# Patient Record
Sex: FEMALE | Race: WHITE | NOT HISPANIC OR LATINO
[De-identification: names, ages, dates, MRNs, and addresses within clinical notes are randomized per-mention and may not be internally consistent; named-entity substitution may affect disease eponyms.]

---

## 2017-03-01 ENCOUNTER — RESULT REVIEW (OUTPATIENT)
Age: 32
End: 2017-03-01

## 2017-03-02 ENCOUNTER — MESSAGE (OUTPATIENT)
Age: 32
End: 2017-03-02

## 2017-03-07 ENCOUNTER — RECORD ABSTRACTING (OUTPATIENT)
Age: 32
End: 2017-03-07

## 2017-03-07 DIAGNOSIS — E10.9 TYPE 1 DIABETES MELLITUS W/OUT COMPLICATIONS: ICD-10-CM

## 2017-03-07 DIAGNOSIS — K90.0 CELIAC DISEASE: ICD-10-CM

## 2017-03-07 DIAGNOSIS — O09.90 SUPERVISION OF HIGH RISK PREGNANCY, UNSPECIFIED, UNSPECIFIED TRIMESTER: ICD-10-CM

## 2017-03-07 DIAGNOSIS — O24.919 UNSPECIFIED DIABETES MELLITUS IN PREGNANCY, UNSPECIFIED TRIMESTER: ICD-10-CM

## 2017-03-07 DIAGNOSIS — R73.09 OTHER ABNORMAL GLUCOSE: ICD-10-CM

## 2017-03-07 DIAGNOSIS — K83.1 LIVER AND BILIARY TRACT DISORDERS IN PREGNANCY, THIRD TRIMESTER: ICD-10-CM

## 2017-03-07 DIAGNOSIS — O26.613 LIVER AND BILIARY TRACT DISORDERS IN PREGNANCY, THIRD TRIMESTER: ICD-10-CM

## 2017-03-07 DIAGNOSIS — K46.9 UNSPECIFIED ABDOMINAL HERNIA W/OUT OBSTRUCTION OR GANGRENE: ICD-10-CM

## 2017-03-07 DIAGNOSIS — Z83.79 FAMILY HISTORY OF OTHER DISEASES OF THE DIGESTIVE SYSTEM: ICD-10-CM

## 2017-03-07 DIAGNOSIS — Z87.59 PERSONAL HISTORY OF OTHER COMPLICATIONS OF PREGNANCY, CHILDBIRTH AND THE PUERPERIUM: ICD-10-CM

## 2017-03-07 DIAGNOSIS — Z81.0 FAMILY HISTORY OF INTELLECTUAL DISABILITIES: ICD-10-CM

## 2017-03-07 DIAGNOSIS — Z83.49 FAMILY HISTORY OF OTHER ENDOCRINE, NUTRITIONAL AND METABOLIC DISEASES: ICD-10-CM

## 2017-03-07 DIAGNOSIS — E03.9 HYPOTHYROIDISM, UNSPECIFIED: ICD-10-CM

## 2017-03-09 ENCOUNTER — APPOINTMENT (OUTPATIENT)
Dept: MATERNAL FETAL MEDICINE | Facility: CLINIC | Age: 32
End: 2017-03-09

## 2017-03-09 ENCOUNTER — APPOINTMENT (OUTPATIENT)
Dept: ANTEPARTUM | Facility: CLINIC | Age: 32
End: 2017-03-09

## 2017-03-09 ENCOUNTER — ASOB RESULT (OUTPATIENT)
Age: 32
End: 2017-03-09

## 2017-03-09 VITALS
BODY MASS INDEX: 25.64 KG/M2 | SYSTOLIC BLOOD PRESSURE: 92 MMHG | WEIGHT: 152 LBS | HEIGHT: 64.5 IN | DIASTOLIC BLOOD PRESSURE: 64 MMHG

## 2017-03-09 RX ORDER — URINE ACETONE TEST STRIPS
STRIP MISCELLANEOUS
Qty: 1 | Refills: 1 | Status: ACTIVE | COMMUNITY
Start: 2017-03-09 | End: 1900-01-01

## 2017-03-10 LAB
CREAT SPEC-SCNC: 34 MG/DL
CREAT/PROT UR: 0.1 RATIO
PROT UR-MCNC: 5 MG/DL

## 2017-03-23 ENCOUNTER — APPOINTMENT (OUTPATIENT)
Dept: MATERNAL FETAL MEDICINE | Facility: CLINIC | Age: 32
End: 2017-03-23

## 2017-04-04 ENCOUNTER — ASOB RESULT (OUTPATIENT)
Age: 32
End: 2017-04-04

## 2017-04-04 ENCOUNTER — LABORATORY RESULT (OUTPATIENT)
Age: 32
End: 2017-04-04

## 2017-04-04 ENCOUNTER — APPOINTMENT (OUTPATIENT)
Dept: MATERNAL FETAL MEDICINE | Facility: CLINIC | Age: 32
End: 2017-04-04

## 2017-04-04 ENCOUNTER — APPOINTMENT (OUTPATIENT)
Dept: ANTEPARTUM | Facility: CLINIC | Age: 32
End: 2017-04-04

## 2017-04-05 LAB
T4 FREE SERPL-MCNC: 1.5 NG/DL
T4 SERPL-MCNC: 12.8 UG/DL

## 2017-04-10 LAB — TSI ACT/NOR SER: 2.7 TSI INDEX

## 2017-05-09 ENCOUNTER — ASOB RESULT (OUTPATIENT)
Age: 32
End: 2017-05-09

## 2017-05-09 ENCOUNTER — APPOINTMENT (OUTPATIENT)
Dept: MATERNAL FETAL MEDICINE | Facility: CLINIC | Age: 32
End: 2017-05-09

## 2017-05-09 ENCOUNTER — APPOINTMENT (OUTPATIENT)
Dept: ANTEPARTUM | Facility: CLINIC | Age: 32
End: 2017-05-09

## 2017-05-19 ENCOUNTER — APPOINTMENT (OUTPATIENT)
Dept: MATERNAL FETAL MEDICINE | Facility: CLINIC | Age: 32
End: 2017-05-19

## 2017-06-01 ENCOUNTER — APPOINTMENT (OUTPATIENT)
Dept: ANTEPARTUM | Facility: CLINIC | Age: 32
End: 2017-06-01

## 2017-06-02 ENCOUNTER — ASOB RESULT (OUTPATIENT)
Age: 32
End: 2017-06-02

## 2017-06-02 ENCOUNTER — APPOINTMENT (OUTPATIENT)
Dept: ANTEPARTUM | Facility: CLINIC | Age: 32
End: 2017-06-02

## 2017-06-06 ENCOUNTER — APPOINTMENT (OUTPATIENT)
Dept: MATERNAL FETAL MEDICINE | Facility: CLINIC | Age: 32
End: 2017-06-06

## 2017-06-06 VITALS — BODY MASS INDEX: 27.75 KG/M2 | WEIGHT: 164.19 LBS

## 2017-06-06 RX ORDER — URINE GLUCOSE-ACET TEST STRIP
STRIP MISCELLANEOUS
Qty: 1 | Refills: 0 | Status: ACTIVE | COMMUNITY
Start: 2017-06-06 | End: 1900-01-01

## 2017-06-12 ENCOUNTER — APPOINTMENT (OUTPATIENT)
Dept: ANTEPARTUM | Facility: CLINIC | Age: 32
End: 2017-06-12

## 2017-06-12 ENCOUNTER — ASOB RESULT (OUTPATIENT)
Age: 32
End: 2017-06-12

## 2017-06-13 ENCOUNTER — MESSAGE (OUTPATIENT)
Age: 32
End: 2017-06-13

## 2017-06-20 ENCOUNTER — APPOINTMENT (OUTPATIENT)
Dept: MATERNAL FETAL MEDICINE | Facility: CLINIC | Age: 32
End: 2017-06-20

## 2017-06-20 VITALS — WEIGHT: 164.19 LBS | BODY MASS INDEX: 27.75 KG/M2

## 2017-06-27 ENCOUNTER — MESSAGE (OUTPATIENT)
Age: 32
End: 2017-06-27

## 2017-06-29 ENCOUNTER — APPOINTMENT (OUTPATIENT)
Dept: MATERNAL FETAL MEDICINE | Facility: CLINIC | Age: 32
End: 2017-06-29

## 2017-06-29 ENCOUNTER — ASOB RESULT (OUTPATIENT)
Age: 32
End: 2017-06-29

## 2017-06-29 ENCOUNTER — APPOINTMENT (OUTPATIENT)
Dept: ANTEPARTUM | Facility: CLINIC | Age: 32
End: 2017-06-29

## 2017-06-29 LAB — HBA1C MFR BLD HPLC: 6.8 %

## 2017-07-08 ENCOUNTER — OUTPATIENT (OUTPATIENT)
Dept: OUTPATIENT SERVICES | Facility: HOSPITAL | Age: 32
LOS: 1 days | End: 2017-07-08
Payer: COMMERCIAL

## 2017-07-08 DIAGNOSIS — O26.899 OTHER SPECIFIED PREGNANCY RELATED CONDITIONS, UNSPECIFIED TRIMESTER: ICD-10-CM

## 2017-07-08 DIAGNOSIS — Z3A.00 WEEKS OF GESTATION OF PREGNANCY NOT SPECIFIED: ICD-10-CM

## 2017-07-08 LAB
APPEARANCE UR: CLEAR — SIGNIFICANT CHANGE UP
BILIRUB UR-MCNC: NEGATIVE — SIGNIFICANT CHANGE UP
COLOR SPEC: SIGNIFICANT CHANGE UP
DIFF PNL FLD: ABNORMAL
GLUCOSE UR QL: 500
KETONES UR-MCNC: NEGATIVE — SIGNIFICANT CHANGE UP
LEUKOCYTE ESTERASE UR-ACNC: NEGATIVE — SIGNIFICANT CHANGE UP
NITRITE UR-MCNC: NEGATIVE — SIGNIFICANT CHANGE UP
PH UR: 7 — SIGNIFICANT CHANGE UP (ref 5–8)
PROT UR-MCNC: NEGATIVE — SIGNIFICANT CHANGE UP
SP GR SPEC: <1.005 — LOW (ref 1.01–1.02)
UROBILINOGEN FLD QL: NEGATIVE — SIGNIFICANT CHANGE UP

## 2017-07-08 PROCEDURE — 81001 URINALYSIS AUTO W/SCOPE: CPT

## 2017-07-08 PROCEDURE — 87086 URINE CULTURE/COLONY COUNT: CPT

## 2017-07-08 PROCEDURE — 59025 FETAL NON-STRESS TEST: CPT

## 2017-07-08 PROCEDURE — G0463: CPT

## 2017-07-09 LAB
CULTURE RESULTS: SIGNIFICANT CHANGE UP
SPECIMEN SOURCE: SIGNIFICANT CHANGE UP

## 2017-07-11 ENCOUNTER — APPOINTMENT (OUTPATIENT)
Dept: MATERNAL FETAL MEDICINE | Facility: CLINIC | Age: 32
End: 2017-07-11

## 2017-07-17 ENCOUNTER — MESSAGE (OUTPATIENT)
Age: 32
End: 2017-07-17

## 2017-07-18 ENCOUNTER — MESSAGE (OUTPATIENT)
Age: 32
End: 2017-07-18

## 2017-08-04 ENCOUNTER — APPOINTMENT (OUTPATIENT)
Dept: ANTEPARTUM | Facility: CLINIC | Age: 32
End: 2017-08-04
Payer: COMMERCIAL

## 2017-08-04 ENCOUNTER — ASOB RESULT (OUTPATIENT)
Age: 32
End: 2017-08-04

## 2017-08-04 ENCOUNTER — APPOINTMENT (OUTPATIENT)
Dept: MATERNAL FETAL MEDICINE | Facility: CLINIC | Age: 32
End: 2017-08-04
Payer: COMMERCIAL

## 2017-08-04 PROCEDURE — G0108 DIAB MANAGE TRN  PER INDIV: CPT

## 2017-08-04 PROCEDURE — 95251 CONT GLUC MNTR ANALYSIS I&R: CPT

## 2017-08-04 PROCEDURE — 76816 OB US FOLLOW-UP PER FETUS: CPT

## 2017-08-08 ENCOUNTER — OTHER (OUTPATIENT)
Age: 32
End: 2017-08-08

## 2017-08-17 ENCOUNTER — APPOINTMENT (OUTPATIENT)
Dept: MATERNAL FETAL MEDICINE | Facility: CLINIC | Age: 32
End: 2017-08-17
Payer: COMMERCIAL

## 2017-08-17 PROCEDURE — G0108 DIAB MANAGE TRN  PER INDIV: CPT

## 2017-08-24 ENCOUNTER — APPOINTMENT (OUTPATIENT)
Dept: MATERNAL FETAL MEDICINE | Facility: CLINIC | Age: 32
End: 2017-08-24
Payer: COMMERCIAL

## 2017-08-24 ENCOUNTER — ASOB RESULT (OUTPATIENT)
Age: 32
End: 2017-08-24

## 2017-08-24 ENCOUNTER — APPOINTMENT (OUTPATIENT)
Dept: ANTEPARTUM | Facility: CLINIC | Age: 32
End: 2017-08-24
Payer: COMMERCIAL

## 2017-08-24 VITALS
SYSTOLIC BLOOD PRESSURE: 110 MMHG | WEIGHT: 179.13 LBS | DIASTOLIC BLOOD PRESSURE: 66 MMHG | BODY MASS INDEX: 30.27 KG/M2

## 2017-08-24 DIAGNOSIS — E03.9 ENDOCRINE, NUTRITIONAL AND METABOLIC DISEASES COMPLICATING PREGNANCY, UNSPECIFIED TRIMESTER: ICD-10-CM

## 2017-08-24 DIAGNOSIS — O99.280 ENDOCRINE, NUTRITIONAL AND METABOLIC DISEASES COMPLICATING PREGNANCY, UNSPECIFIED TRIMESTER: ICD-10-CM

## 2017-08-24 PROCEDURE — 76816 OB US FOLLOW-UP PER FETUS: CPT

## 2017-08-24 PROCEDURE — 76818 FETAL BIOPHYS PROFILE W/NST: CPT

## 2017-08-24 PROCEDURE — G0108 DIAB MANAGE TRN  PER INDIV: CPT

## 2017-08-25 LAB
HBA1C MFR BLD HPLC: 7.3 %
TSH SERPL-ACNC: 1.75 UIU/ML

## 2017-08-29 ENCOUNTER — ASOB RESULT (OUTPATIENT)
Age: 32
End: 2017-08-29

## 2017-08-29 ENCOUNTER — APPOINTMENT (OUTPATIENT)
Dept: ANTEPARTUM | Facility: CLINIC | Age: 32
End: 2017-08-29
Payer: COMMERCIAL

## 2017-08-29 PROCEDURE — 59025 FETAL NON-STRESS TEST: CPT

## 2017-09-01 ENCOUNTER — ASOB RESULT (OUTPATIENT)
Age: 32
End: 2017-09-01

## 2017-09-01 ENCOUNTER — APPOINTMENT (OUTPATIENT)
Dept: ANTEPARTUM | Facility: CLINIC | Age: 32
End: 2017-09-01
Payer: COMMERCIAL

## 2017-09-01 PROCEDURE — 76818 FETAL BIOPHYS PROFILE W/NST: CPT

## 2017-09-05 ENCOUNTER — APPOINTMENT (OUTPATIENT)
Dept: ANTEPARTUM | Facility: CLINIC | Age: 32
End: 2017-09-05
Payer: COMMERCIAL

## 2017-09-05 ENCOUNTER — ASOB RESULT (OUTPATIENT)
Age: 32
End: 2017-09-05

## 2017-09-05 PROCEDURE — 59025 FETAL NON-STRESS TEST: CPT

## 2017-09-08 ENCOUNTER — ASOB RESULT (OUTPATIENT)
Age: 32
End: 2017-09-08

## 2017-09-08 ENCOUNTER — APPOINTMENT (OUTPATIENT)
Dept: ANTEPARTUM | Facility: CLINIC | Age: 32
End: 2017-09-08
Payer: COMMERCIAL

## 2017-09-08 ENCOUNTER — APPOINTMENT (OUTPATIENT)
Dept: MATERNAL FETAL MEDICINE | Facility: CLINIC | Age: 32
End: 2017-09-08
Payer: COMMERCIAL

## 2017-09-08 VITALS — WEIGHT: 179.19 LBS | BODY MASS INDEX: 30.28 KG/M2

## 2017-09-08 PROCEDURE — 76818 FETAL BIOPHYS PROFILE W/NST: CPT

## 2017-09-08 PROCEDURE — G0108 DIAB MANAGE TRN  PER INDIV: CPT

## 2017-09-08 RX ORDER — BLOOD SUGAR DIAGNOSTIC
STRIP MISCELLANEOUS
Qty: 4 | Refills: 3 | Status: ACTIVE | COMMUNITY
Start: 2017-09-08 | End: 1900-01-01

## 2017-09-12 ENCOUNTER — APPOINTMENT (OUTPATIENT)
Dept: MATERNAL FETAL MEDICINE | Facility: CLINIC | Age: 32
End: 2017-09-12

## 2017-09-12 ENCOUNTER — ASOB RESULT (OUTPATIENT)
Age: 32
End: 2017-09-12

## 2017-09-12 ENCOUNTER — APPOINTMENT (OUTPATIENT)
Dept: ANTEPARTUM | Facility: CLINIC | Age: 32
End: 2017-09-12
Payer: COMMERCIAL

## 2017-09-12 ENCOUNTER — MESSAGE (OUTPATIENT)
Age: 32
End: 2017-09-12

## 2017-09-12 PROCEDURE — 59025 FETAL NON-STRESS TEST: CPT

## 2017-09-15 ENCOUNTER — ASOB RESULT (OUTPATIENT)
Age: 32
End: 2017-09-15

## 2017-09-15 ENCOUNTER — APPOINTMENT (OUTPATIENT)
Dept: ANTEPARTUM | Facility: CLINIC | Age: 32
End: 2017-09-15
Payer: COMMERCIAL

## 2017-09-15 PROCEDURE — 76818 FETAL BIOPHYS PROFILE W/NST: CPT

## 2017-09-19 ENCOUNTER — ASOB RESULT (OUTPATIENT)
Age: 32
End: 2017-09-19

## 2017-09-19 ENCOUNTER — APPOINTMENT (OUTPATIENT)
Dept: ANTEPARTUM | Facility: CLINIC | Age: 32
End: 2017-09-19
Payer: COMMERCIAL

## 2017-09-19 ENCOUNTER — APPOINTMENT (OUTPATIENT)
Dept: MATERNAL FETAL MEDICINE | Facility: CLINIC | Age: 32
End: 2017-09-19
Payer: COMMERCIAL

## 2017-09-19 PROCEDURE — 76818 FETAL BIOPHYS PROFILE W/NST: CPT

## 2017-09-19 PROCEDURE — 99213 OFFICE O/P EST LOW 20 MIN: CPT | Mod: 25

## 2017-09-19 PROCEDURE — 76816 OB US FOLLOW-UP PER FETUS: CPT

## 2017-09-19 PROCEDURE — G0108 DIAB MANAGE TRN  PER INDIV: CPT

## 2017-09-22 ENCOUNTER — APPOINTMENT (OUTPATIENT)
Dept: ANTEPARTUM | Facility: CLINIC | Age: 32
End: 2017-09-22

## 2017-09-23 ENCOUNTER — OUTPATIENT (OUTPATIENT)
Dept: OUTPATIENT SERVICES | Facility: HOSPITAL | Age: 32
LOS: 1 days | End: 2017-09-23
Payer: COMMERCIAL

## 2017-09-23 DIAGNOSIS — Z3A.00 WEEKS OF GESTATION OF PREGNANCY NOT SPECIFIED: ICD-10-CM

## 2017-09-23 DIAGNOSIS — O26.899 OTHER SPECIFIED PREGNANCY RELATED CONDITIONS, UNSPECIFIED TRIMESTER: ICD-10-CM

## 2017-09-23 PROCEDURE — 59025 FETAL NON-STRESS TEST: CPT

## 2017-09-23 PROCEDURE — 82239 BILE ACIDS TOTAL: CPT

## 2017-09-23 PROCEDURE — G0463: CPT

## 2017-09-23 PROCEDURE — 83789 MASS SPECTROMETRY QUAL/QUAN: CPT

## 2017-09-23 PROCEDURE — 59025 FETAL NON-STRESS TEST: CPT | Mod: 26

## 2017-09-26 ENCOUNTER — ASOB RESULT (OUTPATIENT)
Age: 32
End: 2017-09-26

## 2017-09-26 ENCOUNTER — APPOINTMENT (OUTPATIENT)
Dept: ANTEPARTUM | Facility: CLINIC | Age: 32
End: 2017-09-26
Payer: COMMERCIAL

## 2017-09-26 ENCOUNTER — APPOINTMENT (OUTPATIENT)
Dept: MATERNAL FETAL MEDICINE | Facility: CLINIC | Age: 32
End: 2017-09-26
Payer: COMMERCIAL

## 2017-09-26 LAB — BILE AC FLD-MCNC: 23.9 UMOL/L — SIGNIFICANT CHANGE UP (ref 4.7–24.5)

## 2017-09-26 PROCEDURE — G0108 DIAB MANAGE TRN  PER INDIV: CPT

## 2017-09-26 PROCEDURE — 95251 CONT GLUC MNTR ANALYSIS I&R: CPT

## 2017-09-26 PROCEDURE — 76818 FETAL BIOPHYS PROFILE W/NST: CPT

## 2017-09-28 LAB — BILE AC P FAST SER-SCNC: ABNORMAL

## 2017-09-29 ENCOUNTER — ASOB RESULT (OUTPATIENT)
Age: 32
End: 2017-09-29

## 2017-09-29 ENCOUNTER — APPOINTMENT (OUTPATIENT)
Dept: ANTEPARTUM | Facility: CLINIC | Age: 32
End: 2017-09-29
Payer: COMMERCIAL

## 2017-09-29 PROCEDURE — 59025 FETAL NON-STRESS TEST: CPT

## 2017-09-30 ENCOUNTER — INPATIENT (INPATIENT)
Facility: HOSPITAL | Age: 32
LOS: 2 days | Discharge: HOME | End: 2017-10-03
Attending: OBSTETRICS & GYNECOLOGY | Admitting: OBSTETRICS & GYNECOLOGY
Payer: COMMERCIAL

## 2017-09-30 DIAGNOSIS — O24.419 GESTATIONAL DIABETES MELLITUS IN PREGNANCY, UNSPECIFIED CONTROL: ICD-10-CM

## 2017-09-30 LAB
BASOPHILS # BLD AUTO: 0.1 K/UL — SIGNIFICANT CHANGE UP (ref 0–0.2)
BASOPHILS NFR BLD AUTO: 0.8 % — SIGNIFICANT CHANGE UP (ref 0–2)
EOSINOPHIL # BLD AUTO: 0.1 K/UL — SIGNIFICANT CHANGE UP (ref 0–0.5)
EOSINOPHIL NFR BLD AUTO: 1 % — SIGNIFICANT CHANGE UP (ref 0–6)
HCT VFR BLD CALC: 33.1 % — LOW (ref 34.5–45)
HGB BLD-MCNC: 11.1 G/DL — LOW (ref 11.5–15.5)
LYMPHOCYTES # BLD AUTO: 2.3 K/UL — SIGNIFICANT CHANGE UP (ref 1–3.3)
LYMPHOCYTES # BLD AUTO: 22 % — SIGNIFICANT CHANGE UP (ref 13–44)
MCHC RBC-ENTMCNC: 27.3 PG — SIGNIFICANT CHANGE UP (ref 27–34)
MCHC RBC-ENTMCNC: 33.5 GM/DL — SIGNIFICANT CHANGE UP (ref 32–36)
MCV RBC AUTO: 81.4 FL — SIGNIFICANT CHANGE UP (ref 80–100)
MONOCYTES # BLD AUTO: 0.5 K/UL — SIGNIFICANT CHANGE UP (ref 0–0.9)
MONOCYTES NFR BLD AUTO: 5.2 % — SIGNIFICANT CHANGE UP (ref 2–14)
NEUTROPHILS # BLD AUTO: 7.3 K/UL — SIGNIFICANT CHANGE UP (ref 1.8–7.4)
NEUTROPHILS NFR BLD AUTO: 71 % — SIGNIFICANT CHANGE UP (ref 43–77)
PLATELET # BLD AUTO: 383 K/UL — SIGNIFICANT CHANGE UP (ref 150–400)
RBC # BLD: 4.07 M/UL — SIGNIFICANT CHANGE UP (ref 3.8–5.2)
RBC # FLD: 13.8 % — SIGNIFICANT CHANGE UP (ref 10.3–14.5)
WBC # BLD: 10.3 K/UL — SIGNIFICANT CHANGE UP (ref 3.8–10.5)
WBC # FLD AUTO: 10.3 K/UL — SIGNIFICANT CHANGE UP (ref 3.8–10.5)

## 2017-09-30 RX ORDER — DEXTROSE 50 % IN WATER 50 %
12.5 SYRINGE (ML) INTRAVENOUS ONCE
Qty: 0 | Refills: 0 | Status: DISCONTINUED | OUTPATIENT
Start: 2017-09-30 | End: 2017-10-03

## 2017-09-30 RX ORDER — DEXTROSE 50 % IN WATER 50 %
25 SYRINGE (ML) INTRAVENOUS ONCE
Qty: 0 | Refills: 0 | Status: DISCONTINUED | OUTPATIENT
Start: 2017-09-30 | End: 2017-10-03

## 2017-09-30 RX ORDER — SODIUM CHLORIDE 9 MG/ML
1000 INJECTION, SOLUTION INTRAVENOUS
Qty: 0 | Refills: 0 | Status: DISCONTINUED | OUTPATIENT
Start: 2017-09-30 | End: 2017-10-03

## 2017-09-30 RX ORDER — DEXTROSE 50 % IN WATER 50 %
1 SYRINGE (ML) INTRAVENOUS ONCE
Qty: 0 | Refills: 0 | Status: DISCONTINUED | OUTPATIENT
Start: 2017-09-30 | End: 2017-10-03

## 2017-09-30 RX ORDER — INSULIN ASPART 100 [IU]/ML
1 INJECTION, SOLUTION SUBCUTANEOUS
Qty: 0 | Refills: 0 | Status: DISCONTINUED | OUTPATIENT
Start: 2017-09-30 | End: 2017-10-01

## 2017-09-30 RX ORDER — GLUCAGON INJECTION, SOLUTION 0.5 MG/.1ML
1 INJECTION, SOLUTION SUBCUTANEOUS ONCE
Qty: 0 | Refills: 0 | Status: DISCONTINUED | OUTPATIENT
Start: 2017-09-30 | End: 2017-10-03

## 2017-09-30 RX ORDER — OXYTOCIN 10 UNIT/ML
333.33 VIAL (ML) INJECTION
Qty: 20 | Refills: 0 | Status: COMPLETED | OUTPATIENT
Start: 2017-09-30

## 2017-09-30 RX ORDER — SODIUM CHLORIDE 9 MG/ML
1000 INJECTION, SOLUTION INTRAVENOUS
Qty: 0 | Refills: 0 | Status: DISCONTINUED | OUTPATIENT
Start: 2017-09-30 | End: 2017-10-01

## 2017-09-30 RX ORDER — OXYTOCIN 10 UNIT/ML
4 VIAL (ML) INJECTION
Qty: 30 | Refills: 0 | Status: DISCONTINUED | OUTPATIENT
Start: 2017-09-30 | End: 2017-10-01

## 2017-09-30 RX ORDER — CITRIC ACID/SODIUM CITRATE 300-500 MG
15 SOLUTION, ORAL ORAL EVERY 4 HOURS
Qty: 0 | Refills: 0 | Status: DISCONTINUED | OUTPATIENT
Start: 2017-09-30 | End: 2017-10-01

## 2017-09-30 RX ORDER — SODIUM CHLORIDE 9 MG/ML
1000 INJECTION INTRAMUSCULAR; INTRAVENOUS; SUBCUTANEOUS
Qty: 0 | Refills: 0 | Status: DISCONTINUED | OUTPATIENT
Start: 2017-09-30 | End: 2017-10-01

## 2017-09-30 RX ORDER — SODIUM CHLORIDE 9 MG/ML
1000 INJECTION, SOLUTION INTRAVENOUS ONCE
Qty: 0 | Refills: 0 | Status: DISCONTINUED | OUTPATIENT
Start: 2017-09-30 | End: 2017-10-01

## 2017-09-30 RX ADMIN — SODIUM CHLORIDE 125 MILLILITER(S): 9 INJECTION INTRAMUSCULAR; INTRAVENOUS; SUBCUTANEOUS at 22:55

## 2017-09-30 RX ADMIN — Medication 4 MILLIUNIT(S)/MIN: at 23:47

## 2017-10-01 VITALS — HEIGHT: 65 IN | WEIGHT: 174.17 LBS

## 2017-10-01 DIAGNOSIS — E11.9 TYPE 2 DIABETES MELLITUS WITHOUT COMPLICATIONS: ICD-10-CM

## 2017-10-01 DIAGNOSIS — E03.9 HYPOTHYROIDISM, UNSPECIFIED: ICD-10-CM

## 2017-10-01 LAB
BLD GP AB SCN SERPL QL: NEGATIVE — SIGNIFICANT CHANGE UP
HBA1C BLD-MCNC: 7.6 % — HIGH (ref 4–5.6)
RH IG SCN BLD-IMP: POSITIVE — SIGNIFICANT CHANGE UP
T PALLIDUM AB TITR SER: NEGATIVE — SIGNIFICANT CHANGE UP

## 2017-10-01 PROCEDURE — 99254 IP/OBS CNSLTJ NEW/EST MOD 60: CPT | Mod: GC

## 2017-10-01 RX ORDER — IBUPROFEN 200 MG
600 TABLET ORAL EVERY 6 HOURS
Qty: 0 | Refills: 0 | Status: COMPLETED | OUTPATIENT
Start: 2017-10-01 | End: 2018-08-30

## 2017-10-01 RX ORDER — OXYTOCIN 10 UNIT/ML
4 VIAL (ML) INJECTION
Qty: 30 | Refills: 0 | Status: DISCONTINUED | OUTPATIENT
Start: 2017-10-01 | End: 2017-10-03

## 2017-10-01 RX ORDER — IBUPROFEN 200 MG
600 TABLET ORAL EVERY 6 HOURS
Qty: 0 | Refills: 0 | Status: DISCONTINUED | OUTPATIENT
Start: 2017-10-01 | End: 2017-10-03

## 2017-10-01 RX ORDER — KETOROLAC TROMETHAMINE 30 MG/ML
30 SYRINGE (ML) INJECTION ONCE
Qty: 0 | Refills: 0 | Status: DISCONTINUED | OUTPATIENT
Start: 2017-10-01 | End: 2017-10-03

## 2017-10-01 RX ORDER — OXYTOCIN 10 UNIT/ML
333.33 VIAL (ML) INJECTION
Qty: 20 | Refills: 0 | Status: COMPLETED | OUTPATIENT
Start: 2017-10-01 | End: 2017-10-01

## 2017-10-01 RX ORDER — OXYCODONE HYDROCHLORIDE 5 MG/1
5 TABLET ORAL
Qty: 0 | Refills: 0 | Status: DISCONTINUED | OUTPATIENT
Start: 2017-10-01 | End: 2017-10-03

## 2017-10-01 RX ORDER — INSULIN ASPART 100 [IU]/ML
1 INJECTION, SOLUTION SUBCUTANEOUS
Qty: 0 | Refills: 0 | Status: DISCONTINUED | OUTPATIENT
Start: 2017-10-01 | End: 2017-10-02

## 2017-10-01 RX ORDER — OXYCODONE HYDROCHLORIDE 5 MG/1
5 TABLET ORAL EVERY 4 HOURS
Qty: 0 | Refills: 0 | Status: DISCONTINUED | OUTPATIENT
Start: 2017-10-01 | End: 2017-10-03

## 2017-10-01 RX ORDER — ACETAMINOPHEN 500 MG
975 TABLET ORAL EVERY 6 HOURS
Qty: 0 | Refills: 0 | Status: COMPLETED | OUTPATIENT
Start: 2017-10-01 | End: 2018-08-30

## 2017-10-01 RX ORDER — ACETAMINOPHEN 500 MG
975 TABLET ORAL EVERY 6 HOURS
Qty: 0 | Refills: 0 | Status: DISCONTINUED | OUTPATIENT
Start: 2017-10-01 | End: 2017-10-03

## 2017-10-01 RX ADMIN — Medication 1000 MILLIUNIT(S)/MIN: at 09:23

## 2017-10-01 RX ADMIN — Medication 1000 MILLIUNIT(S)/MIN: at 08:35

## 2017-10-01 RX ADMIN — Medication 600 MILLIGRAM(S): at 20:49

## 2017-10-01 RX ADMIN — Medication 600 MILLIGRAM(S): at 21:35

## 2017-10-01 RX ADMIN — Medication 4 MILLIUNIT(S)/MIN: at 01:52

## 2017-10-01 NOTE — CONSULT NOTE ADULT - PROBLEM SELECTOR RECOMMENDATION 2
Can restart pregnancy dose of 100mcg daily. Follow up TFTs in 4-6 weeks as outpatient. Can restart pre-pregnancy dose of 100mcg daily. Follow up TFTs in 4-6 weeks as outpatient.

## 2017-10-01 NOTE — CONSULT NOTE ADULT - SUBJECTIVE AND OBJECTIVE BOX
HPI: 32 yr F with Type 1 DM s/p vaginal delivery (3rd pregnancy) on medtronic G670 insulin pump. Patient was diagnosed with T1DM at age of 4 and follows with Dr Zimmerman (endocrinologist). She does not have neuropathy, nephropathy or retinopathy. No macrovascular complications of DM. She has been on insulin pump since 2009. Her A1C pre-pregnancy was 8.0. Patient's A1C during pregnancy was 6.8 with AM glucose values ranging between . At this time A1C 7.6. She was changing her site every other day during pregnancy. Last site change was 9/29.   She was on the following settings: Basal rate 12am 2.1 3am 2.2 8am 1.6 I:C 12am 1:3 11:30 1:4 ISF 20. Post partum basal rate is 0.45.   Patient also has a history of hypothyroidism for which she is on levothyroxine 125mcg daily. Prior to pregnancy she was taking 100 mcg daily. She denies any symptoms of hypo/hyperthyroidism.           PAST MEDICAL & SURGICAL HISTORY: Celiac Disease, T1DM, Hypothyroidism      FAMILY HISTORY:+DM      Social History: nonsmoker, nondrinker    Outpatient Medications: Novolog insulin pump, levothyroxine    MEDICATIONS  (STANDING):  lactated ringers Bolus 1000 milliLiter(s) IV Bolus once  sodium chloride 0.9%. 1000 milliLiter(s) (125 mL/Hr) IV Continuous <Continuous>  dextrose 5% + sodium chloride 0.9%. 1000 milliLiter(s) (125 mL/Hr) IV Continuous <Continuous>  citric acid/sodium citrate Solution 15 milliLiter(s) Oral every 4 hours  dextrose 5%. 1000 milliLiter(s) (50 mL/Hr) IV Continuous <Continuous>  dextrose 50% Injectable 12.5 Gram(s) IV Push once  dextrose 50% Injectable 25 Gram(s) IV Push once  dextrose 50% Injectable 25 Gram(s) IV Push once  insulin aspart (NovoLOG) Pump 1 Each SubCutaneous Continuous Pump  oxytocin Infusion 4 milliUNIT(s)/Min (4 mL/Hr) IV Continuous <Continuous>  ketorolac   Injectable 30 milliGRAM(s) IV Push once  ibuprofen  Tablet 600 milliGRAM(s) Oral every 6 hours  acetaminophen   Tablet. 975 milliGRAM(s) Oral every 6 hours  oxyCODONE    IR 5 milliGRAM(s) Oral every 3 hours    MEDICATIONS  (PRN):  dextrose Gel 1 Dose(s) Oral once PRN Blood Glucose LESS THAN 70 milliGRAM(s)/deciliter  glucagon  Injectable 1 milliGRAM(s) IntraMuscular once PRN Glucose LESS THAN 70 milligrams/deciliter  oxyCODONE    IR 5 milliGRAM(s) Oral every 4 hours PRN Severe Pain (7 -10)      Allergies    No Known Allergies    Intolerances      Review of Systems:  Constitutional: No fever  Eyes: No blurry vision  Neuro: No tremors  HEENT: No pain  Cardiovascular: No chest pain, palpitations  Respiratory: No SOB, no cough  GI: No nausea, vomiting, abdominal pain  : No dysuria  Skin: no rash  Psych: no depression  Endocrine: no polyuria, polydipsia  Hem/lymph: no swelling  Osteoporosis: no fractures    ALL OTHER SYSTEMS REVIEWED AND NEGATIVE        PHYSICAL EXAM:  VITALS: T(C): 36.5 (10-01-17 @ 08:15)  T(F): 97.7 (10-01-17 @ 08:15), Max: 97.7 (10-01-17 @ 08:15)  HR: 72 (10-01-17 @ 09:45) (70 - 105)  BP: 108/65 (10-01-17 @ 09:45) (106/65 - 110/66)  RR:  (16 - 18)  SpO2:  (98% - 100%)  Wt(kg): --  GENERAL: NAD, well-groomed, well-developed  EYES: No proptosis, no lid lag, anicteric  HEENT:  Atraumatic, Normocephalic, moist mucous membranes  THYROID: Normal size, no palpable nodules  RESPIRATORY: Clear to auscultation bilaterally; No rales, rhonchi, wheezing, or rubs  CARDIOVASCULAR: Regular rate and rhythm; No murmurs; no peripheral edema  GI: Soft, nontender, non distended, normal bowel sounds  SKIN: Dry, intact, No rashes or lesions        CAPILLARY BLOOD GLUCOSE  152 (10-01 @ 08:15)  154 (10-01 @ 07:35)  165 (10-01 @ 06:40)  132 (10-01 @ 04:50)  90 (10-01 @ 02:05)  120 (10-01 @ 00:05)  210 (09-30 @ 22:05)                            11.1   10.3  )-----------( 383      ( 30 Sep 2017 23:24 )             33.1                    Hemoglobin A1C, Whole Blood: 7.6 % <H> [4.0 - 5.6] (10-01-17 @ 08:42 HPI: 32 yr F with Type 1 DM s/p vaginal delivery (3rd pregnancy) on medtronic G670 insulin pump. Patient was diagnosed with T1DM at age of 4 and follows with Dr Zimmerman (endocrinologist). She does not have neuropathy, nephropathy or retinopathy. No macrovascular complications of DM. She has been on insulin pump since 2009. Her A1C pre-pregnancy was 8.0. Patient's A1C during pregnancy was 6.8 with AM glucose values ranging between . At this time A1C 7.6. She was changing her site every other day during pregnancy. Last site change was 9/29.   She was on the following settings: Basal rate 12am 2.1 3am 2.2 8am 1.6 I:C 12am 1:3 11:30 1:4 ISF 20. Post partum basal rate is 0.45 instructed by CEDRIC.   Patient also has a history of hypothyroidism for which she is on levothyroxine 125mcg daily. Prior to pregnancy she was taking 100 mcg daily. She denies any symptoms of hypo/hyperthyroidism.     PAST MEDICAL & SURGICAL HISTORY: Celiac Disease, T1DM, Hypothyroidism    FAMILY HISTORY:+DM    Social History: nonsmoker, nondrinker    Outpatient Medications: Novolog insulin pump, levothyroxine    MEDICATIONS  (STANDING):  lactated ringers Bolus 1000 milliLiter(s) IV Bolus once  sodium chloride 0.9%. 1000 milliLiter(s) (125 mL/Hr) IV Continuous <Continuous>  dextrose 5% + sodium chloride 0.9%. 1000 milliLiter(s) (125 mL/Hr) IV Continuous <Continuous>  citric acid/sodium citrate Solution 15 milliLiter(s) Oral every 4 hours  dextrose 5%. 1000 milliLiter(s) (50 mL/Hr) IV Continuous <Continuous>  dextrose 50% Injectable 12.5 Gram(s) IV Push once  dextrose 50% Injectable 25 Gram(s) IV Push once  dextrose 50% Injectable 25 Gram(s) IV Push once  insulin aspart (NovoLOG) Pump 1 Each SubCutaneous Continuous Pump  oxytocin Infusion 4 milliUNIT(s)/Min (4 mL/Hr) IV Continuous <Continuous>  ketorolac   Injectable 30 milliGRAM(s) IV Push once  ibuprofen  Tablet 600 milliGRAM(s) Oral every 6 hours  acetaminophen   Tablet. 975 milliGRAM(s) Oral every 6 hours  oxyCODONE    IR 5 milliGRAM(s) Oral every 3 hours    MEDICATIONS  (PRN):  dextrose Gel 1 Dose(s) Oral once PRN Blood Glucose LESS THAN 70 milliGRAM(s)/deciliter  glucagon  Injectable 1 milliGRAM(s) IntraMuscular once PRN Glucose LESS THAN 70 milligrams/deciliter  oxyCODONE    IR 5 milliGRAM(s) Oral every 4 hours PRN Severe Pain (7 -10)      Allergies    No Known Allergies    Intolerances      Review of Systems:  Constitutional: No fever  Eyes: No blurry vision  Neuro: No tremors  HEENT: No pain  Cardiovascular: No chest pain, palpitations  Respiratory: No SOB, no cough  GI: No nausea, vomiting, abdominal pain  : No dysuria  Skin: no rash  Psych: no depression  Endocrine: no polyuria, polydipsia  Hem/lymph: no swelling  Osteoporosis: no fractures    ALL OTHER SYSTEMS REVIEWED AND NEGATIVE        PHYSICAL EXAM:  VITALS: T(C): 36.5 (10-01-17 @ 08:15)  T(F): 97.7 (10-01-17 @ 08:15), Max: 97.7 (10-01-17 @ 08:15)  HR: 72 (10-01-17 @ 09:45) (70 - 105)  BP: 108/65 (10-01-17 @ 09:45) (106/65 - 110/66)  RR:  (16 - 18)  SpO2:  (98% - 100%)  Wt(kg): --  GENERAL: NAD, well-groomed, well-developed  EYES: No proptosis, no lid lag, anicteric  HEENT:  Atraumatic, Normocephalic, moist mucous membranes  THYROID: Normal size, no palpable nodules  RESPIRATORY: Clear to auscultation bilaterally; No rales, rhonchi, wheezing, or rubs  CARDIOVASCULAR: Regular rate and rhythm; No murmurs; no peripheral edema  GI: Soft, nontender, non distended, normal bowel sounds  SKIN: Dry, intact, No rashes or lesions  EXT: No clubbing, cyanosis or edma  Psych: Alert, awake oriented x 3       CAPILLARY BLOOD GLUCOSE  152 (10-01 @ 08:15)  154 (10-01 @ 07:35)  165 (10-01 @ 06:40)  132 (10-01 @ 04:50)  90 (10-01 @ 02:05)  120 (10-01 @ 00:05)  210 (09-30 @ 22:05)                            11.1   10.3  )-----------( 383      ( 30 Sep 2017 23:24 )             33.1                    Hemoglobin A1C, Whole Blood: 7.6 % <H> [4.0 - 5.6] (10-01-17 @ 08:42

## 2017-10-01 NOTE — CONSULT NOTE ADULT - ATTENDING COMMENTS
Patient seen and examined. Agree with note as above.  Ms. Rocha was seen a few hours after her delivery, which was uneventful. She was decreased to a rate of 0.45 units/hr soon after on her insulin pump. Settings will be changed to close to pre-pregnancy requirements ( approx 1 unit/hr)  as noted above in a few hours. She is well-versed in using her insulin pump and was able to change her settings under our supervision. Otherwise settings will be significantly more lenient than her pregnancy requirements. Will be followed by Dr. Zimmerman as an outpatient.

## 2017-10-01 NOTE — CONSULT NOTE ADULT - PROBLEM SELECTOR RECOMMENDATION 9
Continue basal rate of 0.45 during post partum honeymoon period. Insulin pump site change today. Continue to check fingersticks AC and HS and adjust settings as patient's requirements increase. Continue basal rate of 0.45 during post partum until 5pm then can change to 1.0. Recommend Insulin pump site change today. Will change I:C ratio to 1:8.5 and ISF 50. Continue to check fingersticks AC and HS and adjust settings as patient's requirements increase. -Continue basal rate of 0.45 during post partum until 5pm then can change to 1.0 unit/hr.  - Recommend Insulin pump site change today  -Will change I:C ratio to 1:8.5 and ISF 50  -Continue to check fingersticks AC and HS and adjust settings as patient's requirements increase.

## 2017-10-02 DIAGNOSIS — E03.9 HYPOTHYROIDISM, UNSPECIFIED: ICD-10-CM

## 2017-10-02 DIAGNOSIS — E10.9 TYPE 1 DIABETES MELLITUS WITHOUT COMPLICATIONS: ICD-10-CM

## 2017-10-02 DIAGNOSIS — E10.65 TYPE 1 DIABETES MELLITUS WITH HYPERGLYCEMIA: ICD-10-CM

## 2017-10-02 LAB
HCT VFR BLD CALC: 29.8 % — LOW (ref 34.5–45)
HGB BLD-MCNC: 9.8 G/DL — LOW (ref 11.5–15.5)

## 2017-10-02 PROCEDURE — 99232 SBSQ HOSP IP/OBS MODERATE 35: CPT

## 2017-10-02 RX ORDER — INSULIN ASPART 100 [IU]/ML
1 INJECTION, SOLUTION SUBCUTANEOUS
Qty: 0 | Refills: 0 | Status: DISCONTINUED | OUTPATIENT
Start: 2017-10-02 | End: 2017-10-03

## 2017-10-02 RX ORDER — LEVOTHYROXINE SODIUM 125 MCG
125 TABLET ORAL DAILY
Qty: 0 | Refills: 0 | Status: DISCONTINUED | OUTPATIENT
Start: 2017-10-02 | End: 2017-10-03

## 2017-10-02 RX ADMIN — INSULIN ASPART 1 EACH: 100 INJECTION, SOLUTION SUBCUTANEOUS at 13:07

## 2017-10-02 RX ADMIN — INSULIN ASPART 1 EACH: 100 INJECTION, SOLUTION SUBCUTANEOUS at 14:23

## 2017-10-02 RX ADMIN — Medication 600 MILLIGRAM(S): at 06:05

## 2017-10-02 RX ADMIN — Medication 125 MICROGRAM(S): at 07:00

## 2017-10-02 NOTE — PROGRESS NOTE ADULT - ASSESSMENT
Type 1 diabetes on insulin pump therapy post delivery.  Patient back on pre-pregnancy insulin pump settings.  FS mildly elevated, however patient planning to start breastfeeding, would therefore not change insulin doses.  Has 670g insulin pump at home, once trained will start meter controlled auto mode pump operation.     Can be discharged on the current pump settings.

## 2017-10-02 NOTE — DIETITIAN INITIAL EVALUATION ADULT. - PT NOT SOURCE
Pt visiting with family and refusing visit as doesn't want visitors to step out of room and they are not aware of T1DM. Discussed in length with , RD to return tomorrow morning. RN aware.  educated about menu ordering and carbohydrate counting on consistent carbohydrate, gluten restricted, kosher diet.

## 2017-10-02 NOTE — DIETITIAN INITIAL EVALUATION ADULT. - PERTINENT LABORATORY DATA
finger sticks: pre breakfast 131, post breakfast 202-per RN may be as result of MD decreased basal rates for breastfeeding

## 2017-10-02 NOTE — PROGRESS NOTE ADULT - SUBJECTIVE AND OBJECTIVE BOX
R3 Postpartum Note:    S: Patient examined at the bedside. No events overnight. Pt is ambulating, voiding, tolerating PO and passing stool. Pt is breastfeeding. Pain well controlled.  Minimal lochia. Pt denies any symptoms of hypoglycemia.     O: Vital Signs Last 24 Hrs  T(C): 36.9 (02 Oct 2017 05:05), Max: 37 (01 Oct 2017 12:00)  T(F): 98.4 (02 Oct 2017 05:05), Max: 98.6 (01 Oct 2017 12:00)  HR: 75 (02 Oct 2017 05:05) (64 - 105)  BP: 105/67 (02 Oct 2017 05:05) (93/60 - 111/66)  BP(mean): --  RR: 18 (02 Oct 2017 05:05) (16 - 18)  SpO2: 98% (02 Oct 2017 05:05) (96% - 100%)    Gen: NAD  Abd: soft, NT, ND, fundus firm below umbilicus  Lochia: moderate  Ext: no tenderness    Labs:                        9.8    x     )-----------( x        ( 02 Oct 2017 05:16 )             29.8

## 2017-10-02 NOTE — PROGRESS NOTE ADULT - PROBLEM SELECTOR PLAN 1
-Test BG ac and hs and 2am  -Might continue present use of insulin pump and CGM at settings noted above  -Pt due to change insulin pump site today  -Spoke to pt's endocrinologist (Dr Zimmerman)> MD to f/u care with pt.  -Will sign off now> Informed primary team. -Test BG ac and hs and 2am  -Might continue present use of insulin pump and CGM at settings noted above  -Pt due to change insulin pump site today  -Spoke to pt's endocrinologist (Dr Zimmerman)> MD will see pt this pm and continue endocrine care while in hospital.  -Will sign off now> Informed primary team.  Contact info: [phone] (24/7). pager [phone]

## 2017-10-02 NOTE — DIETITIAN INITIAL EVALUATION ADULT. - OTHER INFO
Pt is a  s/p . Pt with T1DM for 29 years now on insulin pump and continuous glucose monitor. Followed by private endocrinologist and diabetes and pregnancy program. Glycemic control in pregnancy fair. Breastfeeding . Gluten intolerant per .

## 2017-10-02 NOTE — ADVANCED PRACTICE NURSE CONSULT - ASSESSMENT
Pt has H/O T1DM for 29 years and is currently using Medtronic insulin 670G insulin pump.  Pt followed Maternal Fetal Medicine (MFM) clinic during pregnancy and followed by Dr. Magdy Zimmerman for diabetes.  Insulin pump site on left abdomen and remains clean, dry, and intact.  Pt is due to change her insulin pump site TODAY.  Pt denies s/s hypo-hyperglycemia and fbs 131 mg/dl this morning. Pt resumed pre-pregnancy insulin pump settings.  All insulin pump forms completed and in chart. However, attestation form needs prescriber signature. Pt has current insulin pump rates as follows:    Basal  12am – 0.75  units/hour  3am – 1.0 units/hour  8am – 1.2 units/hour     ICR  12am-12am – 1:8.5    ISF  12am –12am: 50    BGT  100-140 mg/dl     Active insulin time: 3 hours

## 2017-10-02 NOTE — PROGRESS NOTE ADULT - PROBLEM SELECTOR PLAN 1
-Type I Diabetic, well controlled on Insulin pump  -Pump settings to be adjusted to prepregnancy basal rate and ISF  -Monitor FS qAC and bedtime  -Monitor for any signs of hypoglycemia  -Appreciate endocrinology reccs

## 2017-10-02 NOTE — PROGRESS NOTE ADULT - SUBJECTIVE AND OBJECTIVE BOX
Vital Signs Last 24 Hrs  T(C): 36.9 (02 Oct 2017 05:05), Max: 37 (01 Oct 2017 12:00)  T(F): 98.4 (02 Oct 2017 05:05), Max: 98.6 (01 Oct 2017 12:00)  HR: 75 (02 Oct 2017 05:05) (64 - 105)  BP: 105/67 (02 Oct 2017 05:05) (93/60 - 111/66)  BP(mean): --  RR: 18 (02 Oct 2017 05:05) (16 - 18)  SpO2: 98% (02 Oct 2017 05:05) (96% - 100%)    Abdomen soft  Fundus Firm  Lochia WNL  Voiding  Stable

## 2017-10-02 NOTE — PROGRESS NOTE ADULT - SUBJECTIVE AND OBJECTIVE BOX
Chief Complaint: 31 y/o Type 1 diabetes seen post delivery for Glycemic control.    Pt. seen by house endocrine post delivery. On insulin pump therapy, started on prepregnancy insulin doses:  Basals: 12am 0.8 u/h  	3am 1.0 u/h  	8 am 1.2 u/H  Car. Coverage 1/8.5  ISF 1/50.  Targets 100-120 mg/dL.  Insulin time 3 hours.  Patient plans to breast feed.    PO intake more stable today, FS  mg/dL pre-meals, 130-202 post meals.  No hypoglycemia.      MEDICATIONS  (STANDING):  dextrose 5%. 1000 milliLiter(s) (50 mL/Hr) IV Continuous <Continuous>  dextrose 50% Injectable 12.5 Gram(s) IV Push once  dextrose 50% Injectable 25 Gram(s) IV Push once  dextrose 50% Injectable 25 Gram(s) IV Push once  oxytocin Infusion 4 milliUNIT(s)/Min (4 mL/Hr) IV Continuous <Continuous>  ketorolac   Injectable 30 milliGRAM(s) IV Push once  oxyCODONE    IR 5 milliGRAM(s) Oral every 3 hours  acetaminophen   Tablet. 975 milliGRAM(s) Oral every 6 hours  ibuprofen  Tablet 600 milliGRAM(s) Oral every 6 hours  levothyroxine 125 MICROGram(s) Oral daily  insulin aspart (NovoLOG) Pump 1 Each SubCutaneous Continuous Pump    MEDICATIONS  (PRN):  dextrose Gel 1 Dose(s) Oral once PRN Blood Glucose LESS THAN 70 milliGRAM(s)/deciliter  glucagon  Injectable 1 milliGRAM(s) IntraMuscular once PRN Glucose LESS THAN 70 milligrams/deciliter  oxyCODONE    IR 5 milliGRAM(s) Oral every 4 hours PRN Severe Pain (7 -10)      Allergies    No Known Allergies    Intolerances        PHYSICAL EXAM:  VITALS: T(C): 36.5 (10-02-17 @ 18:17)  T(F): 97.7 (10-02-17 @ 18:17), Max: 98.4 (10-02-17 @ 05:05)  HR: 70 (10-02-17 @ 18:17) (67 - 84)  BP: 107/74 (10-02-17 @ 18:17) (93/60 - 107/79)  RR:  (16 - 18)  SpO2:  (98% - 98%)  GENERAL: NAD,   EYES: No proptosis,  HEENT:  Atraumatic, Normocephalic,   THYROID: Normal size, no palpable nodules  RESPIRATORY: Clear to auscultation bilaterally  CARDIOVASCULAR: Regular rhythm; No murmurs; no peripheral edema  GI: Soft, nontender, non distended, normal bowel sounds, post delivery  SKIN: Dry, intact, No rashes or lesions  MUSCULOSKELETAL: normal strength  NEURO: extraocular movements intact, no tremor, normal reflexes  PSYCH: Alert and oriented x 3, normal affect, normal mood      CAPILLARY BLOOD GLUCOSE  122 (10-02 @ 14:22)  202 (10-02 @ 11:39)  131 (10-02 @ 09:11)  146 (10-01 @ 23:40)  202 (10-01 @ 20:50)  81 (10-01 @ 14:16)  132 (10-01 @ 10:10)  152 (10-01 @ 08:15)  154 (10-01 @ 07:35)  165 (10-01 @ 06:40)  132 (10-01 @ 04:50)  90 (10-01 @ 02:05)  120 (10-01 @ 00:05)  210 (09-30 @ 22:05)                            9.8    x     )-----------( x        ( 02 Oct 2017 05:16 )             29.8               Thyroid Function Tests:      Hemoglobin A1C, Whole Blood: 7.6 % <H> [4.0 - 5.6] (10-01-17 @ 08:42)

## 2017-10-02 NOTE — PROGRESS NOTE ADULT - ASSESSMENT
33 y/o F w/h/o hypothyroidism/celiac disease and T1DM without complications on insulin pump. S/p Vaginal delivery. Tolerating POs  Glycemic control variable post partum. Pt breast feeding/pumping with BG 80s to low 200s. Pt changed insulin pump settings to pre-pregnancy settings.  Spoke to pt about breast feeding and glycemic control specially prevention of hypoglycemia. Pt aware to test BG prior and right after breast feeding or pumping and having snacks with carbs around.  Pt verbalized understanding and has pretty good knowledge regarding insulin pump use. Will need to determine insulin needs post partum.  Pt's endocrinologist is Dr Magdy Zimmerman.

## 2017-10-02 NOTE — DIETITIAN INITIAL EVALUATION ADULT. - NS AS NUTRI INTERV ED CONTENT
RD to follow up with further education with Patient as feasible./Priority modifications/Purpose of the nutrition education

## 2017-10-02 NOTE — PROGRESS NOTE ADULT - SUBJECTIVE AND OBJECTIVE BOX
Patient seen and examined at bedside, no acute overnight events. No acute complaints, pain well controlled. Patient is ambulating, voiding spontaneously, passing gas, and tolerating regular diet. Pt is breast feeding her baby.    Vital Signs Last 24 Hours  T(C): 36.4 (10-01-17 @ 20:50), Max: 37 (10-01-17 @ 12:00)  HR: 82 (10-01-17 @ 20:50) (64 - 105)  BP: 93/60 (10-01-17 @ 20:50) (93/60 - 111/66)  RR: 18 (10-01-17 @ 20:50) (16 - 18)  SpO2: 98% (10-01-17 @ 20:50) (96% - 100%)    Physical Exam:  General: NAD  Abdomen: Soft, non-tender, non-distended, fundus firm  Pelvic: Lochia wnl    Labs:    Blood Type: B Positive  Antibody Screen: Negative  RPR: Negative               11.1   10.3  )-----------( 383      ( 09-30 @ 23:24 )             33.1         MEDICATIONS  (STANDING):  dextrose 5%. 1000 milliLiter(s) (50 mL/Hr) IV Continuous <Continuous>  dextrose 50% Injectable 12.5 Gram(s) IV Push once  dextrose 50% Injectable 25 Gram(s) IV Push once  dextrose 50% Injectable 25 Gram(s) IV Push once  oxytocin Infusion 4 milliUNIT(s)/Min (4 mL/Hr) IV Continuous <Continuous>  ketorolac   Injectable 30 milliGRAM(s) IV Push once  oxyCODONE    IR 5 milliGRAM(s) Oral every 3 hours  acetaminophen   Tablet. 975 milliGRAM(s) Oral every 6 hours  ibuprofen  Tablet 600 milliGRAM(s) Oral every 6 hours  insulin aspart (NovoLOG) Pump 1 Each SubCutaneous Continuous Pump    MEDICATIONS  (PRN):  dextrose Gel 1 Dose(s) Oral once PRN Blood Glucose LESS THAN 70 milliGRAM(s)/deciliter  glucagon  Injectable 1 milliGRAM(s) IntraMuscular once PRN Glucose LESS THAN 70 milligrams/deciliter  oxyCODONE    IR 5 milliGRAM(s) Oral every 4 hours PRN Severe Pain (7 -10)

## 2017-10-02 NOTE — PROGRESS NOTE ADULT - SUBJECTIVE AND OBJECTIVE BOX
DIABETES FOLLOW UP NOTE:  INTERVAL HX: 33 y/o F w/h/o hypothyroidism/celiac disease and T1DM without complications on insulin pump and CGM. Now s/p Vaginal delivery. Tolerating POs  Glycemic control variable post partum. Pt breast feeding/pumping with BG 80s to low 200s. Changed insulin pump settings to pre-pregnancy settings today. Seen by diabetes educator this am (see note).    Review of Systems:  Cardiovascular: No chest pain, palpitations  Respiratory: No SOB, no cough  GI: No nausea, vomiting, abdominal pain  Endocrine: no polyuria, polydipsia or S&Sx of hypoglycemia    Allergies    No Known Allergies    Intolerances      MEDICATIONS  (STANDING):  levothyroxine 125 MICROGram(s) Oral daily  insulin aspart (NovoLOG) Pump 1 Each SubCutaneous Continuous Pump  Pump settings as follows:  Basal  12am – 0.75  units/hour  3am – 1.0 units/hour  8am – 1.2 units/hour     ICR  12am-12am – 1:8.5    ISF  12am –12am: 50    BGT  100-140 mg/dl     Active insulin time: 3 hours    PHYSICAL EXAM:  VITALS: T(C): 36.6 (10-02-17 @ 08:25)  T(F): 97.9 (10-02-17 @ 08:25), Max: 98.6 (10-01-17 @ 17:28)  HR: 67 (10-02-17 @ 08:25) (67 - 82)  BP: 102/67 (10-02-17 @ 08:25) (93/60 - 105/67)  RR:  (16 - 18)  SpO2:  (98% - 98%)  Wt(kg): --  GENERAL: NAD, Baby and  at bedside  Abdomen: Soft, nontender, post partum. L upper quadrant pump site D&I. L lower quadrant CGM site D&I  Extremities: Warm, no edema noted in all 4 exts  NEURO: A&OX3    LABS:  CAPILLARY BLOOD GLUCOSE  202 (10-02 @ 11:39)  131 (10-02 @ 09:11)  146 (10-01 @ 23:40)  202 (10-01 @ 20:50)  81 (10-01 @ 14:16)  132 (10-01 @ 10:10)  152 (10-01 @ 08:15)  154 (10-01 @ 07:35)  165 (10-01 @ 06:40)  132 (10-01 @ 04:50)  90 (10-01 @ 02:05)  120 (10-01 @ 00:05)  210 (09-30 @ 22:05)                            9.8    x     )-----------( x        ( 02 Oct 2017 05:16 )             29.8     Hemoglobin A1C, Whole Blood: 7.6 % <H> [4.0 - 5.6] (10-01-17 @ 08:42)

## 2017-10-02 NOTE — PROGRESS NOTE ADULT - PROBLEM SELECTOR PLAN 2
Synthroid dose increased from 0.1X71/2 weekly pre-pregnancy to 0.125 during pregnancy.  Continue synthroid 0.125 mg daily for now.  Repeat TFT's 6 weeks after delivery.

## 2017-10-02 NOTE — ADVANCED PRACTICE NURSE CONSULT - RECOMMEDATIONS
Primary RN to f/u with BG monitoring, making sure pt consumes carb consistent meals, insulin pump site assessment, monitoring S/S hypo/hyperglycemia and tracking carb intake, meal/correction boluses. Insulin pump forms all completed and in chart.    DSME provided regarding S/S, prevention and appropriate treatment of hyperglycemia and hypoglycemia.  Reviewed the need to suspend or use a temp basal when she breastfeeds to avoid hypoglycemia while breastfeeding. Pt also needs to check her FS beforehand and consume a snack if her BG FS is less than 100 mg/dl prior to starting.  Pt aware she should only use hospital insulin and glucometer while in hospital and agrees.  Insulin pump BGT increased to 100-140 mg/dl because pt will be breastfeeding.  Pt verbalizes adequate understanding of all instructions via the teach-back method.

## 2017-10-03 ENCOUNTER — APPOINTMENT (OUTPATIENT)
Dept: MATERNAL FETAL MEDICINE | Facility: CLINIC | Age: 32
End: 2017-10-03

## 2017-10-03 ENCOUNTER — APPOINTMENT (OUTPATIENT)
Dept: ANTEPARTUM | Facility: CLINIC | Age: 32
End: 2017-10-03

## 2017-10-03 ENCOUNTER — TRANSCRIPTION ENCOUNTER (OUTPATIENT)
Age: 32
End: 2017-10-03

## 2017-10-03 VITALS
SYSTOLIC BLOOD PRESSURE: 108 MMHG | RESPIRATION RATE: 16 BRPM | TEMPERATURE: 98 F | HEART RATE: 61 BPM | DIASTOLIC BLOOD PRESSURE: 72 MMHG

## 2017-10-03 PROCEDURE — 85018 HEMOGLOBIN: CPT

## 2017-10-03 PROCEDURE — 86900 BLOOD TYPING SEROLOGIC ABO: CPT

## 2017-10-03 PROCEDURE — 86901 BLOOD TYPING SEROLOGIC RH(D): CPT

## 2017-10-03 PROCEDURE — 85027 COMPLETE CBC AUTOMATED: CPT

## 2017-10-03 PROCEDURE — 86780 TREPONEMA PALLIDUM: CPT

## 2017-10-03 PROCEDURE — 86850 RBC ANTIBODY SCREEN: CPT

## 2017-10-03 PROCEDURE — 59025 FETAL NON-STRESS TEST: CPT

## 2017-10-03 PROCEDURE — 59050 FETAL MONITOR W/REPORT: CPT

## 2017-10-03 PROCEDURE — 83036 HEMOGLOBIN GLYCOSYLATED A1C: CPT

## 2017-10-03 RX ADMIN — Medication 125 MICROGRAM(S): at 06:17

## 2017-10-03 NOTE — PROGRESS NOTE ADULT - PROBLEM SELECTOR PLAN 1
-Type I Diabetic, well controlled on Insulin pump  -Pump settings to be adjusted to prepregnancy basal rate and ISF  -Monitor FS qAC and bedtime  -Monitor for any signs of hypoglycemia  -Appreciate endocrinology recs

## 2017-10-03 NOTE — PROGRESS NOTE ADULT - SUBJECTIVE AND OBJECTIVE BOX
PPD#2    Patient seen and examined at bedside, no acute overnight events. No acute complaints, pain well controlled. Patient is ambulating, voiding spontaneously, passing gas, and tolerating regular diet. Seen by Endocrine yesterday with no change to insulin pump settings.     CAPILLARY BLOOD GLUCOSE  98 (03 Oct 2017 02:00)  129 (02 Oct 2017 22:07)  97 (02 Oct 2017 20:05)  122 (02 Oct 2017 14:22)  202 (02 Oct 2017 11:39)  131 (02 Oct 2017 09:11)    Vital Signs Last 24 Hours  T(C): 36.4 (10-03-17 @ 06:00), Max: 37 (10-02-17 @ 21:00)  HR: 69 (10-03-17 @ 06:00) (67 - 92)  BP: 104/70 (10-03-17 @ 06:00) (102/67 - 107/79)  RR: 18 (10-03-17 @ 06:00) (16 - 18)  SpO2: 99% (10-02-17 @ 21:00) (98% - 99%)    Physical Exam:  General: NAD  Abdomen: Soft, non-tender, non-distended, fundus firm  Pelvic: Lochia wnl    Labs:    Blood Type: B Positive  Antibody Screen: Negative  RPR: Negative               9.8    x     )-----------( x        ( 10-02 @ 05:16 )             29.8                11.1   10.3  )-----------( 383      ( 09-30 @ 23:24 )             33.1       MEDICATIONS  (STANDING):  acetaminophen   Tablet. 975 milliGRAM(s) Oral every 6 hours  dextrose 5%. 1000 milliLiter(s) (50 mL/Hr) IV Continuous <Continuous>  dextrose 50% Injectable 12.5 Gram(s) IV Push once  dextrose 50% Injectable 25 Gram(s) IV Push once  dextrose 50% Injectable 25 Gram(s) IV Push once  ibuprofen  Tablet 600 milliGRAM(s) Oral every 6 hours  insulin aspart (NovoLOG) Pump 1 Each SubCutaneous Continuous Pump  ketorolac   Injectable 30 milliGRAM(s) IV Push once  levothyroxine 125 MICROGram(s) Oral daily  oxyCODONE    IR 5 milliGRAM(s) Oral every 3 hours  oxytocin Infusion 4 milliUNIT(s)/Min (4 mL/Hr) IV Continuous <Continuous>    MEDICATIONS  (PRN):  dextrose Gel 1 Dose(s) Oral once PRN Blood Glucose LESS THAN 70 milliGRAM(s)/deciliter  glucagon  Injectable 1 milliGRAM(s) IntraMuscular once PRN Glucose LESS THAN 70 milligrams/deciliter  oxyCODONE    IR 5 milliGRAM(s) Oral every 4 hours PRN Severe Pain (7 -10)

## 2017-10-03 NOTE — DISCHARGE NOTE OB - CARE PROVIDER_API CALL
Shala Cruz), Obstetrics and Gynecology  [address] [facility] [address]  [address]  [place] [address]  Phone: [phone]  Fax: [phone]

## 2017-10-03 NOTE — DISCHARGE NOTE OB - PATIENT PORTAL LINK FT
“You can access the [facility] Patient Portal, offered by [facility], by registering with the following website: [email or web address]”

## 2017-10-03 NOTE — CHART NOTE - NSCHARTNOTEFT_GEN_A_CORE
Pt seen for postpartum diabetes education, Pt had requested revisit from this RD. Pt with T1DM x 29years, on insulin pump. Pt reports history of gluten intolerance. Per Pt brought to hospital most of her own gluten free, kosher foods. Insulin pump settings now at pre pregnancy settings and blood sugars well controlled . Appetite good, denies GI distress. Breastfeeding . Pt able to verbalize prevention and management of hypoglycemia with lactation and aware of possible need to establish a temporary basal rate for breastfeeding. Reviewed nutritional needs to support breastfeeding and dietary strategies to maintain optimal blood glucose control. RD to remain available and follow-up as medically appropriate.

## 2017-10-03 NOTE — PROGRESS NOTE ADULT - SUBJECTIVE AND OBJECTIVE BOX
VS: Vital Signs Last 24 Hrs  T(C): 36.4 (03 Oct 2017 06:00), Max: 37 (02 Oct 2017 21:00)  T(F): 97.6 (03 Oct 2017 06:00), Max: 98.6 (02 Oct 2017 21:00)  HR: 69 (03 Oct 2017 06:00) (69 - 92)  BP: 104/70 (03 Oct 2017 06:00) (104/70 - 107/79)  BP(mean): --  RR: 18 (03 Oct 2017 06:00) (16 - 18)  SpO2: 99% (02 Oct 2017 21:00) (99% - 99%)    Abdomen soft, fundus firm  Lochia WNL  Voiding, stable

## 2018-01-15 ENCOUNTER — RESULT REVIEW (OUTPATIENT)
Age: 33
End: 2018-01-15

## 2018-07-22 PROBLEM — Z81.0 FAMILY HISTORY OF MENTAL RETARDATION: Status: ACTIVE | Noted: 2017-03-07

## 2020-12-28 NOTE — DIETITIAN INITIAL EVALUATION ADULT. - PATIENT MEETING ESTIMATED NUTRIENT NEEDS
Statement Selected CBC Full  -  ( 28 Dec 2020 06:02 )  WBC Count : 12.80 K/uL  RBC Count : 4.16 M/uL  Hemoglobin : 11.9 g/dL  Hematocrit : 38.2 %  Platelet Count - Automated : 280 K/uL  Mean Cell Volume : 91.8 fl  Mean Cell Hemoglobin : 28.6 pg  Mean Cell Hemoglobin Concentration : 31.2 gm/dL  Auto Neutrophil # : 9.15 K/uL  Auto Lymphocyte # : 2.04 K/uL  Auto Monocyte # : 1.11 K/uL  Auto Eosinophil # : 0.29 K/uL  Auto Basophil # : 0.13 K/uL  Auto Neutrophil % : 71.5 %  Auto Lymphocyte % : 15.9 %  Auto Monocyte % : 8.7 %  Auto Eosinophil % : 2.3 %  Auto Basophil % : 1.0 %    12-27    137  |  100  |  16  ----------------------------<  134<H>  4.5   |  25  |  0.86    Ca    10.7<H>      27 Dec 2020 20:05  Phos  2.7     12-27  Mg     1.9     12-27    TPro  7.0  /  Alb  3.4  /  TBili  0.4  /  DBili  x   /  AST  36  /  ALT  18  /  AlkPhos  408<H>  12-27

## 2021-04-08 NOTE — CONSULT NOTE ADULT - ASSESSMENT
Wife reports some GI upset with Bactrim  Also some confusion  Urine symptoms are improving  Will switch to Keflex 500 mg 3 times daily for 7 days  Will put notification in chart on Bactrim intolerance  32 yr F with PMH of T1DM on [employer] insulin pump s/p vaginal delivery. Now that patient is post partum, insulin requirements will decrease significantly.

## 2022-01-12 ENCOUNTER — RESULT REVIEW (OUTPATIENT)
Age: 37
End: 2022-01-12

## 2023-01-03 NOTE — PROGRESS NOTE ADULT - PROBLEM SELECTOR PROBLEM 2
What Type Of Note Output Would You Prefer (Optional)?: Bullet Format What Is The Reason For Today's Visit?: Full Body Skin Examination What Is The Reason For Today's Visit? (Being Monitored For X): the development of new lesions Additional History: Patient has concerns with a fungal rash on her L foot, to reports it is spreading up to the ankle. Pt reports toe nails are affected as well.\\nPt has used Econazole cream in the past Acquired hypothyroidism